# Patient Record
Sex: FEMALE | Race: WHITE | URBAN - METROPOLITAN AREA
[De-identification: names, ages, dates, MRNs, and addresses within clinical notes are randomized per-mention and may not be internally consistent; named-entity substitution may affect disease eponyms.]

---

## 2024-11-22 ENCOUNTER — OFFICE VISIT (OUTPATIENT)
Dept: PODIATRY CLINIC | Facility: CLINIC | Age: 69
End: 2024-11-22

## 2024-11-22 DIAGNOSIS — M20.41 HAMMER TOES OF BOTH FEET: ICD-10-CM

## 2024-11-22 DIAGNOSIS — E11.42 DIABETIC POLYNEUROPATHY ASSOCIATED WITH TYPE 2 DIABETES MELLITUS (HCC): Primary | ICD-10-CM

## 2024-11-22 DIAGNOSIS — M20.42 HAMMER TOES OF BOTH FEET: ICD-10-CM

## 2024-11-22 DIAGNOSIS — B35.1 ONYCHOMYCOSIS: ICD-10-CM

## 2024-11-22 DIAGNOSIS — G12.21 ALS (AMYOTROPHIC LATERAL SCLEROSIS) (HCC): ICD-10-CM

## 2024-11-22 PROCEDURE — 99203 OFFICE O/P NEW LOW 30 MIN: CPT | Performed by: STUDENT IN AN ORGANIZED HEALTH CARE EDUCATION/TRAINING PROGRAM

## 2024-11-24 NOTE — PROGRESS NOTES
[facility] Podiatry  Progress Note    Magdalena White is a 69 year old female.   Chief Complaint   Patient presents with    Toenail Care     Nail care and foot check- fbg 79- A1c 10.7 05/13/24- ldv 11/27/24-          HPI:     Patient is a very pleasant 69-year-old female with past medical history of diabetes and ALS who presents to clinic for foot exam with sister.  She has elongated and thickened nails she has difficulty trimming on her own.  She does use walker for ambulation due to ALS.  She denies any open sores to feet or other concerns.  Her blood sugars were 79 mg/dL and last checked.  Her last hemoglobin A1c was 10.7% on 5/13/2024.  No other complaints are mentioned.      Allergies: Sulfa antibiotics and Xolair [omalizumab]   No current outpatient medications on file.      History reviewed. No pertinent past medical history.   History reviewed. No pertinent surgical history.   History reviewed. No pertinent family history.   Social History     Socioeconomic History    Marital status: Single           REVIEW OF SYSTEMS:     No n/v/f/c.      EXAM:   There were no vitals taken for this visit.  GENERAL: well developed, well nourished, in no apparent distress  EXTREMITIES:   1. Integument: Normal skin temperature and turgor. Nails x 10 are elongated and thickened and with subungual debris.  2. Vascular: Pedal pulses are palpable.   3. Musculoskeletal: Flexion contracture to lesser digits. Decreased strength from ALS.   4. Neurological: Normal sharp dull sensation; reflexes normal. Protective sensation mostly intact via 10g monofilament.          ASSESSMENT AND PLAN:   Diagnoses and all orders for this visit:    Diabetic polyneuropathy associated with type 2 diabetes mellitus (HCC)    Hammer toes of both feet    Onychomycosis    ALS (amyotrophic lateral sclerosis) ([facility])        Plan:    -Patient examined, chart history reviewed.  -Discussed importance of proper pedal hygiene, regular foot checks, and tight  glucose control.  -Sharply debrided nails x10 with a sterile nail nipper achieving a 20% reduction in thickness and length, without incident. Nails further smoothed with dremel.  -Ambulate with supportive shoes and inserts and avoid walking barefoot.  -Educated patient on acute signs of infection advised patient to seek immediate medical attention if symptoms arise.     The patient indicates understanding of these issues and agrees to the plan.    RTC 2 months.    Fidencio Evans DPM    Dragon speech recognition software was used to prepare this note.  Errors in word recognition may occur.  Please contact me with any questions/concerns with this note.

## 2025-02-14 ENCOUNTER — OFFICE VISIT (OUTPATIENT)
Dept: PODIATRY CLINIC | Facility: CLINIC | Age: 70
End: 2025-02-14

## 2025-02-14 DIAGNOSIS — M20.42 HAMMER TOES OF BOTH FEET: ICD-10-CM

## 2025-02-14 DIAGNOSIS — M20.41 HAMMER TOES OF BOTH FEET: ICD-10-CM

## 2025-02-14 DIAGNOSIS — E11.42 DIABETIC POLYNEUROPATHY ASSOCIATED WITH TYPE 2 DIABETES MELLITUS (HCC): Primary | ICD-10-CM

## 2025-02-14 DIAGNOSIS — B35.1 ONYCHOMYCOSIS: ICD-10-CM

## 2025-02-14 DIAGNOSIS — G12.21 ALS (AMYOTROPHIC LATERAL SCLEROSIS) (HCC): ICD-10-CM

## 2025-02-14 PROCEDURE — 99213 OFFICE O/P EST LOW 20 MIN: CPT | Performed by: STUDENT IN AN ORGANIZED HEALTH CARE EDUCATION/TRAINING PROGRAM

## 2025-02-15 NOTE — PROGRESS NOTES
[facility] Podiatry  Progress Note    Magdalena White is a 69 year old female.   Chief Complaint   Patient presents with    Diabetic Foot Care     12 weeks f/u - last LmR1K=9.1 from 2/7/25 - has no c/o regarding her feet - this AM her OI=148         HPI:     Patient is a very pleasant 69-year-old female with past medical history of diabetes and ALS who presents to clinic for foot exam with sister.  She has elongated and thickened nails she has difficulty trimming on her own.  She does use walker for ambulation due to ALS.  She denies any open sores to feet or other concerns.  Her blood sugars were 182 mg/dL and last checked.  Her last hemoglobin A1c was 7.1% on 2/7/2025.  No other complaints are mentioned.      Allergies: Sulfa antibiotics and Xolair [omalizumab]   No current outpatient medications on file.      History reviewed. No pertinent past medical history.   History reviewed. No pertinent surgical history.   History reviewed. No pertinent family history.   Social History     Socioeconomic History    Marital status: Single           REVIEW OF SYSTEMS:     No n/v/f/c.      EXAM:   There were no vitals taken for this visit.  GENERAL: well developed, well nourished, in no apparent distress  EXTREMITIES:   1. Integument: Normal skin temperature and turgor. Nails x 10 are elongated and thickened and with subungual debris, especially hallux nails.  2. Vascular: Pedal pulses are palpable.   3. Musculoskeletal: Flexion contracture to lesser digits. Decreased strength from ALS.   4. Neurological: Normal sharp dull sensation; reflexes normal. Protective sensation mostly intact via 10g monofilament.    Bilateral barefoot skin diabetic exam is abnormal with dystrophic nails and/or dry skin         ASSESSMENT AND PLAN:   Diagnoses and all orders for this visit:    Diabetic polyneuropathy associated with type 2 diabetes mellitus (HCC)    Hammer toes of both feet    Onychomycosis    ALS (amyotrophic lateral sclerosis)  ([facility])        Plan:    -Patient examined, chart history reviewed.  -Discussed importance of proper pedal hygiene, regular foot checks, and tight glucose control.  -Sharply debrided nails x10 with a sterile nail nipper achieving a 20% reduction in thickness and length, without incident. Nails further smoothed with dremel.  -Ambulate with supportive shoes and inserts and avoid walking barefoot.  -Educated patient on acute signs of infection advised patient to seek immediate medical attention if symptoms arise.     The patient indicates understanding of these issues and agrees to the plan.    RTC 2 months.    DESHAUN Osorio speech recognition software was used to prepare this note.  Errors in word recognition may occur.  Please contact me with any questions/concerns with this note.

## 2025-02-17 ENCOUNTER — SPINE CENTER NAVIGATION (OUTPATIENT)
Age: 70
End: 2025-02-17

## 2025-02-17 ENCOUNTER — APPOINTMENT (OUTPATIENT)
Dept: GENERAL RADIOLOGY | Age: 70
End: 2025-02-17
Attending: EMERGENCY MEDICINE
Payer: MEDICARE

## 2025-02-17 ENCOUNTER — HOSPITAL ENCOUNTER (OUTPATIENT)
Age: 70
Discharge: HOME | End: 2025-02-17
Payer: MEDICARE

## 2025-02-17 VITALS
TEMPERATURE: 98 F | OXYGEN SATURATION: 97 % | DIASTOLIC BLOOD PRESSURE: 72 MMHG | HEART RATE: 77 BPM | SYSTOLIC BLOOD PRESSURE: 124 MMHG | RESPIRATION RATE: 20 BRPM

## 2025-02-17 DIAGNOSIS — M54.50 ACUTE RIGHT-SIDED LOW BACK PAIN, UNSPECIFIED WHETHER SCIATICA PRESENT: ICD-10-CM
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
## 2025-02-17 DIAGNOSIS — S22.080A T12 COMPRESSION FRACTURE, INITIAL ENCOUNTER (HCC): ICD-10-CM

## 2025-02-17 PROCEDURE — 73502 X-RAY EXAM HIP UNI 2-3 VIEWS: CPT | Performed by: EMERGENCY MEDICINE

## 2025-02-17 PROCEDURE — 72110 X-RAY EXAM L-2 SPINE 4/>VWS: CPT | Performed by: EMERGENCY MEDICINE

## 2025-02-17 PROCEDURE — 99204 OFFICE O/P NEW MOD 45 MIN: CPT | Performed by: EMERGENCY MEDICINE

## 2025-02-17 RX ORDER — SEMAGLUTIDE 2.68 MG/ML
2 INJECTION, SOLUTION SUBCUTANEOUS
COMMUNITY
Start: 2024-08-29

## 2025-02-17 RX ORDER — HYDROCODONE BITARTRATE AND ACETAMINOPHEN 5; 325 MG/1; MG/1
TABLET ORAL
Qty: 10 TABLET | Refills: 0 | Status: SHIPPED | OUTPATIENT
Start: 2025-02-17 | End: 2025-02-17

## 2025-02-17 RX ORDER — MONTELUKAST SODIUM 10 MG/1
10 TABLET ORAL DAILY
COMMUNITY

## 2025-02-17 RX ORDER — VALSARTAN AND HYDROCHLOROTHIAZIDE 80; 12.5 MG/1; MG/1
1 TABLET, FILM COATED ORAL DAILY
COMMUNITY
Start: 2023-03-29

## 2025-02-17 RX ORDER — DIPHENHYDRAMINE HCL 25 MG
25 TABLET ORAL EVERY 6 HOURS PRN
COMMUNITY

## 2025-02-17 RX ORDER — GABAPENTIN 600 MG/1
600 TABLET ORAL 4 TIMES DAILY
COMMUNITY
Start: 2025-02-03

## 2025-02-17 RX ORDER — LEVOTHYROXINE SODIUM 125 UG/1
125 TABLET ORAL
COMMUNITY
Start: 2022-12-05

## 2025-02-17 RX ORDER — ESOMEPRAZOLE MAGNESIUM 40 MG/1
80 CAPSULE, DELAYED RELEASE ORAL
COMMUNITY

## 2025-02-17 RX ORDER — URSODIOL 300 MG/1
600 CAPSULE ORAL 2 TIMES DAILY
COMMUNITY

## 2025-02-17 RX ORDER — INSULIN ASPART 100 [IU]/ML
10 INJECTION, SUSPENSION SUBCUTANEOUS 2 TIMES DAILY WITH MEALS
COMMUNITY

## 2025-02-17 RX ORDER — TRAMADOL HYDROCHLORIDE 25 MG/1
1 TABLET, COATED ORAL EVERY 8 HOURS PRN
Qty: 10 TABLET | Refills: 0 | Status: SHIPPED | OUTPATIENT
Start: 2025-02-17

## 2025-02-17 RX ORDER — MEXILETINE HYDROCHLORIDE 150 MG/1
300 CAPSULE ORAL 3 TIMES DAILY
COMMUNITY
Start: 2024-09-11

## 2025-02-17 RX ORDER — ALBUTEROL SULFATE 0.83 MG/ML
3 SOLUTION RESPIRATORY (INHALATION) EVERY 4 HOURS PRN
COMMUNITY
Start: 2023-12-19

## 2025-02-17 RX ORDER — INSULIN DEGLUDEC 100 U/ML
40 INJECTION, SOLUTION SUBCUTANEOUS DAILY
COMMUNITY
Start: 2024-12-20

## 2025-02-17 RX ORDER — ATORVASTATIN CALCIUM 40 MG/1
40 TABLET, FILM COATED ORAL DAILY
COMMUNITY
Start: 2022-11-10

## 2025-02-17 RX ORDER — FEXOFENADINE HCL 180 MG/1
180 TABLET ORAL DAILY
COMMUNITY

## 2025-02-17 RX ORDER — CALCIUM CARBONATE/VITAMIN D3 500 MG-10
1 TABLET ORAL DAILY
COMMUNITY

## 2025-02-17 RX ORDER — INSULIN ASPART 100 [IU]/ML
INJECTION, SOLUTION INTRAVENOUS; SUBCUTANEOUS
COMMUNITY
Start: 2025-02-03

## 2025-02-17 RX ORDER — DULOXETIN HYDROCHLORIDE 30 MG/1
30 CAPSULE, DELAYED RELEASE ORAL DAILY
COMMUNITY
Start: 2024-12-30

## 2025-02-17 RX ORDER — FLUTICASONE PROPIONATE 50 MCG
2 SPRAY, SUSPENSION (ML) NASAL DAILY
COMMUNITY

## 2025-02-17 NOTE — ED PROVIDER NOTES
Patient Seen in: [facility]      History     Chief Complaint   Patient presents with    Fall     Stated Complaint: Fall    Subjective:   HPI  Magdalena White is a 69 year old female here for mechanical fall 9 days ago.  Symptoms not getting better. No numbness, tingling, or new lower extremity weakness.  Medical history includes not limited to ALS, diabetes, hypertension,    Objective:     Past Medical History:    ALS (amyotrophic lateral sclerosis) (HCC)    Asthma (HCC)    Diabetes (HCC)    Essential hypertension    Primary biliary cirrhosis (HCC)    Thyroid disease              Past Surgical History:   Procedure Laterality Date    Anesth,surgery of shoulder Right     Fracture surgery      Total abdom hysterectomy                  No pertinent social history.            Review of Systems    Positive for stated complaint: Fall  Other systems are as noted in HPI.  Constitutional and vital signs reviewed.      All other systems reviewed and negative except as noted above.    Physical Exam     ED Triage Vitals [02/17/25 1254]   /80   Pulse 95   Resp 18   Temp 98.2 °F (36.8 °C)   Temp src Oral   SpO2 95 %   O2 Device None (Room air)       Current Vitals:   Vital Signs  BP: 124/72  Pulse: 77  Resp: 20  Temp: 98.2 °F (36.8 °C)  Temp src: Oral    Oxygen Therapy  SpO2: 97 %  O2 Device: None (Room air)        Physical Exam  Vitals and nursing note reviewed.   Constitutional:       General: She is not in acute distress.     Appearance: Normal appearance. She is not ill-appearing or toxic-appearing.   HENT:      Head: Normocephalic.      Nose: Nose normal.   Eyes:      Pupils: Pupils are equal, round, and reactive to light.   Cardiovascular:      Rate and Rhythm: Normal rate.      Pulses: Normal pulses.   Pulmonary:      Effort: Pulmonary effort is normal.   Musculoskeletal:         General: No deformity.      Comments: Decreased range of motion to lumbar spine due to lower back pain.  Tenderness  over T12, L1, and L2.  No bruising, swelling, or rash.   Skin:     Capillary Refill: Capillary refill takes less than 2 seconds.   Neurological:      General: No focal deficit present.      Mental Status: She is alert and oriented to person, place, and time.      Sensory: No sensory deficit.   Psychiatric:         Mood and Affect: Mood normal.         Behavior: Behavior normal.         Thought Content: Thought content normal.         Judgment: Judgment normal.             ED Course   Labs Reviewed - No data to display                MDM           Medical Decision Making    Back pain DDx: strain, pna, pna, disc herniation, Cauda equina, spinal stenosis, Vertebral Malignancy/Mets, Spinal Fracture, Osteomyelitis, Epidural Abscess, Infected/Obstructing Kidney Stone, referred internal organ pain, somatic etc.    Treat for suspicion for T12 fracture due to fall, and x-rays.  Low-dose tramadol sent to the pharmacy.  Otc medications discussed along with alternative care.  No urinary symptoms.  Continues to deny new lower extremity weakness, saddle numbness, hypoesthesia, or anesthesia.  Modified activity and work should be implemented; no neurologic signs.  Avoid heavy lifting, pulling, or pushing.    Use good body mechanics    Specialist:   Due to medical history, symptoms, and x-ray spinal clinic order placed.    I Updated patient  on all findings, who verbalized understanding and agreement with the plan. Steady gait.   I explained to the patient that emergent conditions may arise and to go to the ER for new, worsening or any persistent conditions.   All questions answered. No acute distress and cleared for home    Problems Addressed:  Acute right-sided low back pain, unspecified whether sciatica present: acute illness or injury  Fall, initial encounter: acute illness or injury  T12 compression fracture, initial encounter (HCC): acute illness or injury    Amount and/or Complexity of Data Reviewed  External Data Reviewed:  notes.    Risk  OTC drugs.  Prescription drug management.        Disposition and Plan     Clinical Impression:  1. Fall, initial encounter    2. Acute right-sided low back pain, unspecified whether sciatica present    3. T12 compression fracture, initial encounter ([facility])         Disposition:  Discharge  2/17/2025  3:17 pm    Follow-up:  Troy Rivers MD  [address] [place] [address] [place] [id number]  [phone]                Medications Prescribed:  Discharge Medication List as of 2/17/2025  3:24 PM        START taking these medications    Details   traMADol HCl 25 MG Oral Tab Take 1 tablet by mouth every 8 (eight) hours as needed., Normal, Disp-10 tablet, R-0                 Supplementary Documentation:

## 2025-02-17 NOTE — PROGRESS NOTES
Spine Center Referral Navigation Encounter Note    Referred by: LUIS FERNANDO Rodriguez    Imaging: XR Lumbar Spine   If imaging done at an external facility, instructed patient to bring disc of MRI to appointment.     Previously Seen Spine Care Providers: None    Referred to: Josias Mendoza MD in Pain Management     2/17- Spoke to patient and was able to schedule for  2/26 at 1pm. She has a T12 compression fracture so she was scheduled with Dr. Mendoza.

## 2025-02-26 ENCOUNTER — OFFICE VISIT (OUTPATIENT)
Dept: PAIN CLINIC | Facility: CLINIC | Age: 70
End: 2025-02-26
Payer: COMMERCIAL

## 2025-02-26 VITALS
SYSTOLIC BLOOD PRESSURE: 124 MMHG | DIASTOLIC BLOOD PRESSURE: 62 MMHG | WEIGHT: 180 LBS | OXYGEN SATURATION: 96 % | HEART RATE: 80 BPM

## 2025-02-26 DIAGNOSIS — S22.080A CLOSED WEDGE COMPRESSION FRACTURE OF T12 VERTEBRA, INITIAL ENCOUNTER (HCC): Primary | ICD-10-CM

## 2025-02-26 PROBLEM — S22.080D CLOSED WEDGE COMPRESSION FRACTURE OF T12 VERTEBRA WITH ROUTINE HEALING: Status: ACTIVE | Noted: 2025-02-26

## 2025-02-26 PROCEDURE — 99204 OFFICE O/P NEW MOD 45 MIN: CPT | Performed by: ANESTHESIOLOGY

## 2025-02-26 RX ORDER — ALBUTEROL SULFATE 90 UG/1
1 INHALANT RESPIRATORY (INHALATION) AS DIRECTED
COMMUNITY

## 2025-02-26 RX ORDER — RILUZOLE 50 MG/1
50 TABLET, FILM COATED ORAL
COMMUNITY
Start: 2025-01-31

## 2025-02-26 RX ORDER — POLYETHYLENE GLYCOL 3350 17 G/17G
17 POWDER, FOR SOLUTION ORAL DAILY
COMMUNITY

## 2025-02-26 RX ORDER — METHYLPREDNISOLONE 4 MG/1
TABLET ORAL
COMMUNITY
Start: 2025-02-11

## 2025-02-26 RX ORDER — HYDROCODONE BITARTRATE AND ACETAMINOPHEN 5; 325 MG/1; MG/1
1 TABLET ORAL EVERY 6 HOURS PRN
COMMUNITY
Start: 2025-02-20

## 2025-02-26 NOTE — PATIENT INSTRUCTIONS
Refill policies:    Allow 2-3 business days for refills; controlled substances may take longer.  Contact your pharmacy at least 5 days prior to running out of medication and have them send an electronic request or submit request through the “request refill” option in your [employer] account.  Refills are not addressed on weekends; covering physicians do not authorize routine medications on weekends.  No narcotics or controlled substances are refilled after noon on Fridays or by on call physicians.  By law, narcotics must be electronically prescribed.  A 30 day supply with no refills is the maximum allowed.  If your prescription is due for a refill, you may be due for a follow up appointment.  To best provide you care, patients receiving routine medications need to be seen at least once a year.  Patients receiving narcotic/controlled substance medications need to be seen at least once every 3 months.  In the event that your preferred pharmacy does not have the requested medication in stock (e.g. Backordered), it is your responsibility to find another pharmacy that has the requested medication available.  We will gladly send a new prescription to that pharmacy at your request.    Scheduling Tests:    If your physician has ordered radiology tests such as MRI or CT scans, please contact Central Scheduling at [phone] right away to schedule the test.  Once scheduled, the [facility] Centralized Referral Team will work with your insurance carrier to obtain pre-certification or prior authorization.  Depending on your insurance carrier, approval may take 3-10 days.  It is highly recommended patients assure they have received an authorization before having a test performed.  If test is done without insurance authorization, patient may be responsible for the entire amount billed.      Precertification and Prior Authorizations:  If your physician has recommended that you have a procedure or additional testing performed the [facility]  Centralized Referral Team will contact your insurance carrier to obtain pre-certification or prior authorization.    You are strongly encouraged to contact your insurance carrier to verify that your procedure/test has been approved and is a COVERED benefit.  Although the [facility] Centralized Referral Team does its due diligence, the insurance carrier gives the disclaimer that \"Although the procedure is authorized, this does not guarantee payment.\"    Ultimately the patient is responsible for payment.   Thank you for your understanding in this matter.  Paperwork Completion:  If you require FMLA or disability paperwork for your recovery, please make sure to either drop it off or have it faxed to our office at [phone]. Be sure the form has your name and date of birth on it.  The form will be faxed to our Forms Department and they will complete it for you.  There is a 25$ fee for all forms that need to be filled out.  Please be aware there is a 10-14 day turnaround time.  You will need to sign a release of information (VIK) form if your paperwork does not come with one.  You may call the Forms Department with any questions at [phone].  Their fax number is [phone].

## 2025-02-26 NOTE — PROGRESS NOTES
Subjective:   Patient ID: Magdalena White is a 69 year old female.    HPI    History/Other:   Review of Systems  Current Outpatient Medications   Medication Sig Dispense Refill    albuterol (2.5 MG/3ML) 0.083% Inhalation Nebu Soln Inhale 3 mL into the lungs every 4 (four) hours as needed.      atorvastatin 40 MG Oral Tab Take 1 tablet (40 mg total) by mouth daily.      Calcium Carb-Cholecalciferol 500-10 MG-MCG Oral Tab Take 1 tablet by mouth daily.      diphenhydrAMINE HCl (BENADRYL ALLERGY) 25 MG Oral Tab Take 1 tablet (25 mg total) by mouth every 6 (six) hours as needed.      DULoxetine 30 MG Oral Cap DR Particles Take 1 capsule (30 mg total) by mouth daily.      Esomeprazole Magnesium 40 MG Oral Capsule Delayed Release Take 2 capsules (80 mg total) by mouth before breakfast.      fexofenadine 180 MG Oral Tab Take 1 tablet (180 mg total) by mouth daily. (Patient not taking: Reported on 2/17/2025)      fluticasone propionate 50 MCG/ACT Nasal Suspension 2 sprays by Nasal route daily.      gabapentin 600 MG Oral Tab 1 tablet (600 mg total) in the morning, at noon, in the evening, and at bedtime.      NOVOLOG FLEXPEN 100 UNIT/ML Subcutaneous Solution Pen-injector INJECT 10 UNITS UNDER THE SKIN BEFORE MEALS THREE TIMES DAILY      Insulin Aspart Prot & Aspart (INSULIN ASP PROT & ASP FLEXPEN) (70-30) 100 UNIT/ML Subcutaneous Suspension Pen-injector Inject 10 Units into the skin 2 (two) times daily with meals.      insulin degludec (TRESIBA FLEXTOUCH) 100 UNIT/ML Subcutaneous Solution Pen-injector Inject 40 Units into the skin daily.      levothyroxine 125 MCG Oral Tab Take 1 tablet (125 mcg total) by mouth before breakfast.      mexiletine 150 MG Oral Cap Take 2 capsules (300 mg total) by mouth 3 (three) times daily.      montelukast 10 MG Oral Tab Take 1 tablet (10 mg total) by mouth daily.      OZEMPIC, 2 MG/DOSE, 8 MG/3ML Subcutaneous Solution Pen-injector Inject 2 mg into the skin every 7 days.      ursodiol 300 MG  Oral Cap Take 2 capsules (600 mg total) by mouth 2 (two) times daily.      valsartan-hydroCHLOROthiazide 80-12.5 MG Oral Tab Take 1 tablet by mouth daily.      traMADol HCl 25 MG Oral Tab Take 1 tablet by mouth every 8 (eight) hours as needed. 10 tablet 0     Allergies:Allergies[1]    Objective:   Physical Exam    Assessment & Plan:   No diagnosis found.    No orders of the defined types were placed in this encounter.      Meds This Visit:  Requested Prescriptions      No prescriptions requested or ordered in this encounter       Imaging & Referrals:  None      Location of Pain: low back    Date Pain Began: 02/08/25          Work Related:   No        Receiving Work Comp/Disability:   No    Numeric Rating Scale:  Pain at Present:  3                                                                                                            (No Pain) 0  to  10 (Worst Pain)                 Minimum Pain:   3  Maximum Pain  8    Distribution of Pain:    right    Quality of Pain:   sharp/stabbing and shooting    Origin of Pain:    Traumatic Accident    Aggravating Factors:    Other bending    Past Treatments for Current Pain Condition:   Other medication    Prior diagnostic testing for your pain:  xray          [1]   Allergies  Allergen Reactions    Omalizumab UNKNOWN, OTHER (SEE COMMENTS) and ITCHING     Runny nose    Nasal congestion     Xolair    Sulfa Antibiotics HIVES    Clarithromycin RASH

## 2025-02-26 NOTE — H&P
Name: Magdalena White   : 1955   DOS: 2025     Chief complaint: Back pain    History of present illness:  Magdalena White is a 69 year old female with a history of hypertension, biliary cirrhosis, diabetes, ALS who presents today for evaluation of back pain.  The patient suffered a fall approximately 2 weeks ago which led to onset of pain at the thoracolumbar junction.  This is described as sharp and stabbing.  This is made worse by activity.  Patient was seen in urgent care and x-ray shows age-indeterminate T12 compression fracture.  Referred here to discuss treatment options.  Of note, she is taking hydrocodone which does help with her pain.    She denies any chills, fever or weakness. She denies any bladder or bowel incontinence.      Past Medical History:    ALS (amyotrophic lateral sclerosis) (HCC)    Asthma (HCC)    Diabetes (HCC)    Essential hypertension    Primary biliary cirrhosis (HCC)    Thyroid disease      Current Outpatient Medications   Medication Sig Dispense Refill    HYDROcodone-acetaminophen 5-325 MG Oral Tab Take 1 tablet by mouth every 6 (six) hours as needed.      methylPREDNISolone 4 MG Oral Tablet Therapy Pack       Polyethylene Glycol 3350 17 g Oral Powd Pack Take 17 g by mouth daily.      riluzole 50 MG Oral Tab Take 1 tablet (50 mg total) by mouth.      albuterol 108 (90 Base) MCG/ACT Inhalation Aero Soln Inhale 1 puff into the lungs As Directed.      albuterol (2.5 MG/3ML) 0.083% Inhalation Nebu Soln Inhale 3 mL into the lungs every 4 (four) hours as needed.      atorvastatin 40 MG Oral Tab Take 1 tablet (40 mg total) by mouth daily.      Calcium Carb-Cholecalciferol 500-10 MG-MCG Oral Tab Take 1 tablet by mouth daily.      diphenhydrAMINE HCl (BENADRYL ALLERGY) 25 MG Oral Tab Take 1 tablet (25 mg total) by mouth every 6 (six) hours as needed.      DULoxetine 30 MG Oral Cap DR Particles Take 1 capsule (30 mg total) by mouth daily.      Esomeprazole Magnesium 40 MG Oral  Capsule Delayed Release Take 2 capsules (80 mg total) by mouth before breakfast.      fexofenadine 180 MG Oral Tab Take 1 tablet (180 mg total) by mouth daily.      fluticasone propionate 50 MCG/ACT Nasal Suspension 2 sprays by Nasal route daily.      gabapentin 600 MG Oral Tab 1 tablet (600 mg total) in the morning, at noon, in the evening, and at bedtime.      NOVOLOG FLEXPEN 100 UNIT/ML Subcutaneous Solution Pen-injector INJECT 10 UNITS UNDER THE SKIN BEFORE MEALS THREE TIMES DAILY      Insulin Aspart Prot & Aspart (INSULIN ASP PROT & ASP FLEXPEN) (70-30) 100 UNIT/ML Subcutaneous Suspension Pen-injector Inject 10 Units into the skin 2 (two) times daily with meals.      insulin degludec (TRESIBA FLEXTOUCH) 100 UNIT/ML Subcutaneous Solution Pen-injector Inject 40 Units into the skin daily.      levothyroxine 125 MCG Oral Tab Take 1 tablet (125 mcg total) by mouth before breakfast.      mexiletine 150 MG Oral Cap Take 2 capsules (300 mg total) by mouth 3 (three) times daily.      montelukast 10 MG Oral Tab Take 1 tablet (10 mg total) by mouth daily.      OZEMPIC, 2 MG/DOSE, 8 MG/3ML Subcutaneous Solution Pen-injector Inject 2 mg into the skin every 7 days.      ursodiol 300 MG Oral Cap Take 2 capsules (600 mg total) by mouth 2 (two) times daily.      valsartan-hydroCHLOROthiazide 80-12.5 MG Oral Tab Take 1 tablet by mouth daily.       Past Surgical History:   Procedure Laterality Date    Anesth,surgery of shoulder Right     Fracture surgery      Total abdom hysterectomy        No family history on file.  Social History     Socioeconomic History    Marital status: Single     Social Drivers of Health     Food Insecurity: No Food Insecurity (1/9/2025)    Received from [facility]    Food Insecurity     Currently or in the past 3 months, have you worried your food would run out before you had money to buy more?: No     In the past 12 months, have you run out of food or been unable to get more?: No    Transportation Needs: No Transportation Needs (1/9/2025)    Received from [facility]    Transportation Needs     Currently or in the past 3 months, has lack of transportation kept you from medical appointments, getting food or medicine, or providing care to a family member?: Unrecognized value     Medical Transportation Needs?: No   Housing Stability: Low Risk  (5/14/2024)    Received from [facility]    Housing Stability     Are you worried that your electric, gas, oil, or water might be shut off?: No     Are you concerned about having a safe and reliable place to live?: No       Review of  other systems:  10 point ROS otherwise negative    Physical examination: Magdalena is a 69 year old female not in acute distress  /62   Pulse 80   Wt 180 lb (81.6 kg)   SpO2 96%    The patient is awake, alert, oriented and corporative. She has a normal affect. The patient ambulates with normal gait.  HEENT: No gross lesion noted. PEERL. No icterus.  Neck and Upper Extremity: Supple. No thyromegaly or lymphadenopathy.   Motor Examination:    (R)   (L)  Deltoid:      5    5  Biceps:       5    5  Triceps:      5    5  Wrist Extension:     5    5  Wrist Flexsion:     5    5  Finger Extension:     5    5  Finger Flexsion:     5    5    Cardiovascular system: No peripheral edema  Respiratory system: Speech is nonlabored.  Skin: Warm, dry  Neurologic:  Cranial nerves II through XII are grossly intact.       Examination of the lower back: Gait is intact.  Does have pain to palpation of the spinous processes along the midline  Radiology diagnostic studies:   Thoracic x-ray reviewed with T12 compression fracture  DEXA scan reviewed from 2021 with loss of bone density but does not quite meet definition of osteopenia    Assessment:  Encounter Diagnosis   Name Primary?    Closed wedge compression fracture of T12 vertebra, initial encounter (HCC) Yes   .      Plan:     The patient is a  69-year-old female who presents today for evaluation of back pain.  Patient has sudden onset of back pain after suffering a fall.  Was seen in the urgent care with x-ray showing T12 compression fracture.  This was not seen on her April 2024 MRI.  Presumed fracture occurred from fall however, will need MRI to determine chronicity of fracture and to see if there is any retropulsion.  Discussed follow-up after MRI is completed to facilitate kyphoplasty.  Additionally, patient does have a DEXA from 2021 which showed significant loss of bone density but at that time did not quite meet criteria for osteopenia.  This likely changed.  However, to confirm that, we will order repeat DEXA.      Josias Mendoza MD MPH  Pain Management

## 2025-03-03 ENCOUNTER — HOSPITAL ENCOUNTER (OUTPATIENT)
Dept: BONE DENSITY | Age: 70
Discharge: HOME | End: 2025-03-03
Attending: ANESTHESIOLOGY
Payer: MEDICARE

## 2025-03-03 DIAGNOSIS — S22.080A CLOSED WEDGE COMPRESSION FRACTURE OF T12 VERTEBRA, INITIAL ENCOUNTER (HCC): ICD-10-CM

## 2025-03-03 PROCEDURE — 77080 DXA BONE DENSITY AXIAL: CPT | Performed by: ANESTHESIOLOGY

## 2025-04-18 ENCOUNTER — OFFICE VISIT (OUTPATIENT)
Dept: PODIATRY CLINIC | Facility: CLINIC | Age: 70
End: 2025-04-18

## 2025-04-18 DIAGNOSIS — M20.42 HAMMER TOES OF BOTH FEET: ICD-10-CM

## 2025-04-18 DIAGNOSIS — E11.42 DIABETIC POLYNEUROPATHY ASSOCIATED WITH TYPE 2 DIABETES MELLITUS (HCC): Primary | ICD-10-CM

## 2025-04-18 DIAGNOSIS — G12.21 ALS (AMYOTROPHIC LATERAL SCLEROSIS) (HCC): ICD-10-CM

## 2025-04-18 DIAGNOSIS — B35.1 ONYCHOMYCOSIS: ICD-10-CM

## 2025-04-18 DIAGNOSIS — M20.41 HAMMER TOES OF BOTH FEET: ICD-10-CM

## 2025-04-18 PROCEDURE — 99213 OFFICE O/P EST LOW 20 MIN: CPT | Performed by: STUDENT IN AN ORGANIZED HEALTH CARE EDUCATION/TRAINING PROGRAM

## 2025-04-18 RX ORDER — BUDESONIDE AND FORMOTEROL FUMARATE DIHYDRATE 80; 4.5 UG/1; UG/1
2 AEROSOL RESPIRATORY (INHALATION) EVERY 12 HOURS
COMMUNITY
Start: 2025-04-03

## 2025-04-18 RX ORDER — SENNOSIDES A AND B 8.6 MG/1
1 TABLET, FILM COATED ORAL 2 TIMES DAILY
COMMUNITY
Start: 2025-04-12

## 2025-04-18 RX ORDER — DICYCLOMINE HYDROCHLORIDE 10 MG/1
10 CAPSULE ORAL 3 TIMES DAILY PRN
COMMUNITY
Start: 2025-04-12

## 2025-04-18 RX ORDER — TRAMADOL HYDROCHLORIDE 50 MG/1
50 TABLET ORAL EVERY 6 HOURS PRN
COMMUNITY
Start: 2025-04-12

## 2025-04-18 NOTE — PROGRESS NOTES
[facility] Podiatry  Progress Note    Magdalena White is a 69 year old female.   Chief Complaint   Patient presents with    Diabetic Foot Care     Nail care and foot check-- A1C 7.1 on 2/7         HPI:     Patient is a very pleasant 69-year-old female with past medical history of diabetes and ALS who presents to clinic for foot exam with sister.  She has elongated and thickened nails she has difficulty trimming on her own.  She does use walker for ambulation due to ALS.  She denies any open sores to feet or other concerns.  Her blood sugars were 134 mg/dL and last checked.  Her last hemoglobin A1c was 7.1% on 2/7/2025.  No other complaints are mentioned.      Allergies: Omalizumab, Sulfa antibiotics, and Clarithromycin   Current Outpatient Medications   Medication Sig Dispense Refill    Apixaban Starter Pack 5 MG Oral Tablet Therapy Pack Take 2 tablets (10 mg total) by mouth 2 (two) times a day for 7 days, then take 1 tablet (5 mg total) by mouth  2 (two) times a day      Budesonide-Formoterol Fumarate 80-4.5 MCG/ACT Inhalation Aerosol Inhale 2 puffs into the lungs in the morning and 2 puffs in the evening.      dicyclomine 10 MG Oral Cap Take 1 capsule (10 mg total) by mouth 3 (three) times daily as needed.      linaCLOtide 145 MCG Oral Cap Take 145 mcg by mouth daily.      sennosides 8.6 MG Oral Tab Take 1 tablet (8.6 mg total) by mouth 2 (two) times daily.      traMADol 50 MG Oral Tab Take 1 tablet (50 mg total) by mouth every 6 (six) hours as needed.      HYDROcodone-acetaminophen 5-325 MG Oral Tab Take 1 tablet by mouth every 6 (six) hours as needed.      methylPREDNISolone 4 MG Oral Tablet Therapy Pack       Polyethylene Glycol 3350 17 g Oral Powd Pack Take 17 g by mouth daily.      riluzole 50 MG Oral Tab Take 1 tablet (50 mg total) by mouth.      albuterol 108 (90 Base) MCG/ACT Inhalation Aero Soln Inhale 1 puff into the lungs As Directed.      albuterol (2.5 MG/3ML) 0.083% Inhalation Nebu Soln  Inhale 3 mL into the lungs every 4 (four) hours as needed.      atorvastatin 40 MG Oral Tab Take 1 tablet (40 mg total) by mouth daily.      Calcium Carb-Cholecalciferol 500-10 MG-MCG Oral Tab Take 1 tablet by mouth daily.      diphenhydrAMINE HCl (BENADRYL ALLERGY) 25 MG Oral Tab Take 1 tablet (25 mg total) by mouth every 6 (six) hours as needed.      DULoxetine 30 MG Oral Cap DR Particles Take 1 capsule (30 mg total) by mouth daily.      Esomeprazole Magnesium 40 MG Oral Capsule Delayed Release Take 2 capsules (80 mg total) by mouth before breakfast.      fexofenadine 180 MG Oral Tab Take 1 tablet (180 mg total) by mouth daily.      fluticasone propionate 50 MCG/ACT Nasal Suspension 2 sprays by Nasal route daily.      gabapentin 600 MG Oral Tab 1 tablet (600 mg total) in the morning, at noon, in the evening, and at bedtime.      NOVOLOG FLEXPEN 100 UNIT/ML Subcutaneous Solution Pen-injector INJECT 10 UNITS UNDER THE SKIN BEFORE MEALS THREE TIMES DAILY      Insulin Aspart Prot & Aspart (INSULIN ASP PROT & ASP FLEXPEN) (70-30) 100 UNIT/ML Subcutaneous Suspension Pen-injector Inject 10 Units into the skin 2 (two) times daily with meals.      insulin degludec (TRESIBA FLEXTOUCH) 100 UNIT/ML Subcutaneous Solution Pen-injector Inject 40 Units into the skin daily.      levothyroxine 125 MCG Oral Tab Take 1 tablet (125 mcg total) by mouth before breakfast.      mexiletine 150 MG Oral Cap Take 2 capsules (300 mg total) by mouth 3 (three) times daily.      montelukast 10 MG Oral Tab Take 1 tablet (10 mg total) by mouth daily.      OZEMPIC, 2 MG/DOSE, 8 MG/3ML Subcutaneous Solution Pen-injector Inject 2 mg into the skin every 7 days.      ursodiol 300 MG Oral Cap Take 2 capsules (600 mg total) by mouth 2 (two) times daily.      valsartan-hydroCHLOROthiazide 80-12.5 MG Oral Tab Take 1 tablet by mouth daily.        Past Medical History:    ALS (amyotrophic lateral sclerosis) (HCC)    Asthma (HCC)    Diabetes (HCC)    Essential  hypertension    Primary biliary cirrhosis (HCC)    Thyroid disease      Past Surgical History:   Procedure Laterality Date    Anesth,surgery of shoulder Right     Fracture surgery      Total abdom hysterectomy        History reviewed. No pertinent family history.   Social History     Socioeconomic History    Marital status: Single           REVIEW OF SYSTEMS:     No n/v/f/c.      EXAM:   There were no vitals taken for this visit.  GENERAL: well developed, well nourished, in no apparent distress  EXTREMITIES:   1. Integument: Normal skin temperature and turgor. Nails x 10 are elongated and thickened and with subungual debris, especially hallux nails.  2. Vascular: Pedal pulses are palpable.   3. Musculoskeletal: Flexion contracture to lesser digits. Decreased strength from ALS.   4. Neurological: Normal sharp dull sensation; reflexes normal. Protective sensation mostly intact via 10g monofilament.    Bilateral barefoot skin diabetic exam is abnormal with dystrophic nails and/or dry skin         ASSESSMENT AND PLAN:   Diagnoses and all orders for this visit:    Diabetic polyneuropathy associated with type 2 diabetes mellitus (HCC)    Hammer toes of both feet    Onychomycosis    ALS (amyotrophic lateral sclerosis) ([facility])          Plan:    -Patient examined, chart history reviewed.  -Discussed importance of proper pedal hygiene, regular foot checks, and tight glucose control.  -Sharply debrided nails x10 with a sterile nail nipper achieving a 20% reduction in thickness and length, without incident. Nails further smoothed with dremel.  -Ambulate with supportive shoes and inserts and avoid walking barefoot.  -Educated patient on acute signs of infection advised patient to seek immediate medical attention if symptoms arise.     The patient indicates understanding of these issues and agrees to the plan.    RTC 2 months.    Fidencio Evans DPM    Dragon speech recognition software was used to prepare this note.  Errors in word  recognition may occur.  Please contact me with any questions/concerns with this note.

## 2025-06-20 ENCOUNTER — OFFICE VISIT (OUTPATIENT)
Dept: PODIATRY CLINIC | Facility: CLINIC | Age: 70
End: 2025-06-20

## 2025-06-20 DIAGNOSIS — M20.41 HAMMER TOES OF BOTH FEET: ICD-10-CM

## 2025-06-20 DIAGNOSIS — E11.42 DIABETIC POLYNEUROPATHY ASSOCIATED WITH TYPE 2 DIABETES MELLITUS (HCC): Primary | ICD-10-CM

## 2025-06-20 DIAGNOSIS — M20.42 HAMMER TOES OF BOTH FEET: ICD-10-CM

## 2025-06-20 DIAGNOSIS — G12.21 ALS (AMYOTROPHIC LATERAL SCLEROSIS) (HCC): ICD-10-CM

## 2025-06-20 DIAGNOSIS — B35.1 ONYCHOMYCOSIS: ICD-10-CM

## 2025-06-20 PROCEDURE — 99213 OFFICE O/P EST LOW 20 MIN: CPT | Performed by: STUDENT IN AN ORGANIZED HEALTH CARE EDUCATION/TRAINING PROGRAM

## 2025-06-20 NOTE — PROGRESS NOTES
[facility] Podiatry  Progress Note    Magdalena White is a 69 year old female.   Chief Complaint   Patient presents with    Diabetic Foot Care     Nail care and foot check- - A1C 7.1 on 2/7         HPI:     Patient is a very pleasant 69-year-old female with past medical history of diabetes and ALS who presents to clinic for foot exam with sister.  She has elongated and thickened nails she has difficulty trimming on her own.  She does use walker for ambulation due to ALS.  She denies any open sores to feet or other concerns.  Her blood sugars were 328 mg/dL and last checked.  Her last hemoglobin A1c was 7.1% on 2/7/2025.  No other complaints are mentioned.      Allergies: Omalizumab, Sulfa antibiotics, and Clarithromycin   Current Outpatient Medications   Medication Sig Dispense Refill    Apixaban Starter Pack 5 MG Oral Tablet Therapy Pack Take 2 tablets (10 mg total) by mouth 2 (two) times a day for 7 days, then take 1 tablet (5 mg total) by mouth  2 (two) times a day      Budesonide-Formoterol Fumarate 80-4.5 MCG/ACT Inhalation Aerosol Inhale 2 puffs into the lungs in the morning and 2 puffs in the evening.      dicyclomine 10 MG Oral Cap Take 1 capsule (10 mg total) by mouth 3 (three) times daily as needed.      linaCLOtide 145 MCG Oral Cap Take 145 mcg by mouth daily.      sennosides 8.6 MG Oral Tab Take 1 tablet (8.6 mg total) by mouth 2 (two) times daily.      traMADol 50 MG Oral Tab Take 1 tablet (50 mg total) by mouth every 6 (six) hours as needed.      HYDROcodone-acetaminophen 5-325 MG Oral Tab Take 1 tablet by mouth every 6 (six) hours as needed.      methylPREDNISolone 4 MG Oral Tablet Therapy Pack       Polyethylene Glycol 3350 17 g Oral Powd Pack Take 17 g by mouth daily.      riluzole 50 MG Oral Tab Take 1 tablet (50 mg total) by mouth.      albuterol 108 (90 Base) MCG/ACT Inhalation Aero Soln Inhale 1 puff into the lungs As Directed.      albuterol (2.5 MG/3ML) 0.083% Inhalation Nebu Soln  Inhale 3 mL into the lungs every 4 (four) hours as needed.      atorvastatin 40 MG Oral Tab Take 1 tablet (40 mg total) by mouth daily.      Calcium Carb-Cholecalciferol 500-10 MG-MCG Oral Tab Take 1 tablet by mouth daily.      diphenhydrAMINE HCl (BENADRYL ALLERGY) 25 MG Oral Tab Take 1 tablet (25 mg total) by mouth every 6 (six) hours as needed.      DULoxetine 30 MG Oral Cap DR Particles Take 1 capsule (30 mg total) by mouth daily.      Esomeprazole Magnesium 40 MG Oral Capsule Delayed Release Take 2 capsules (80 mg total) by mouth before breakfast.      fexofenadine 180 MG Oral Tab Take 1 tablet (180 mg total) by mouth daily.      fluticasone propionate 50 MCG/ACT Nasal Suspension 2 sprays by Nasal route daily.      gabapentin 600 MG Oral Tab 1 tablet (600 mg total) in the morning, at noon, in the evening, and at bedtime.      NOVOLOG FLEXPEN 100 UNIT/ML Subcutaneous Solution Pen-injector INJECT 10 UNITS UNDER THE SKIN BEFORE MEALS THREE TIMES DAILY      Insulin Aspart Prot & Aspart (INSULIN ASP PROT & ASP FLEXPEN) (70-30) 100 UNIT/ML Subcutaneous Suspension Pen-injector Inject 10 Units into the skin 2 (two) times daily with meals.      insulin degludec (TRESIBA FLEXTOUCH) 100 UNIT/ML Subcutaneous Solution Pen-injector Inject 40 Units into the skin daily.      levothyroxine 125 MCG Oral Tab Take 1 tablet (125 mcg total) by mouth before breakfast.      mexiletine 150 MG Oral Cap Take 2 capsules (300 mg total) by mouth 3 (three) times daily.      montelukast 10 MG Oral Tab Take 1 tablet (10 mg total) by mouth daily.      ursodiol 300 MG Oral Cap Take 2 capsules (600 mg total) by mouth 2 (two) times daily.      valsartan-hydroCHLOROthiazide 80-12.5 MG Oral Tab Take 1 tablet by mouth daily.      OZEMPIC, 2 MG/DOSE, 8 MG/3ML Subcutaneous Solution Pen-injector Inject 2 mg into the skin every 7 days. (Patient not taking: Reported on 6/20/2025)        Past Medical History:    ALS (amyotrophic lateral sclerosis) (HCC)     Asthma (HCC)    Diabetes (HCC)    Essential hypertension    Primary biliary cirrhosis (HCC)    Thyroid disease      Past Surgical History:   Procedure Laterality Date    Anesth,surgery of shoulder Right     Fracture surgery      Total abdom hysterectomy        History reviewed. No pertinent family history.   Social History     Socioeconomic History    Marital status: Single           REVIEW OF SYSTEMS:     No n/v/f/c.      EXAM:   There were no vitals taken for this visit.  GENERAL: well developed, well nourished, in no apparent distress  EXTREMITIES:   1. Integument: Normal skin temperature and turgor. Nails x 10 are elongated and thickened and with subungual debris, especially hallux nails.  2. Vascular: Pedal pulses are palpable.   3. Musculoskeletal: Flexion contracture to lesser digits. Decreased strength from ALS.   4. Neurological: Normal sharp dull sensation; reflexes normal. Protective sensation mostly intact via 10g monofilament.    Bilateral barefoot skin diabetic exam is abnormal with dystrophic nails and/or dry skin         ASSESSMENT AND PLAN:   Diagnoses and all orders for this visit:    Diabetic polyneuropathy associated with type 2 diabetes mellitus (HCC)    Hammer toes of both feet    Onychomycosis    ALS (amyotrophic lateral sclerosis) ([facility])            Plan:    -Patient examined, chart history reviewed.  -Discussed importance of proper pedal hygiene, regular foot checks, and tight glucose control.  -Sharply debrided nails x10 with a sterile nail nipper achieving a 20% reduction in thickness and length, without incident. Nails further smoothed with dremel.  -Ambulate with supportive shoes and inserts and avoid walking barefoot.  -Educated patient on acute signs of infection advised patient to seek immediate medical attention if symptoms arise.     The patient indicates understanding of these issues and agrees to the plan.    RTC 2 months.    DESHAUN Osorio speech recognition software was  used to prepare this note.  Errors in word recognition may occur.  Please contact me with any questions/concerns with this note.

## 2025-08-22 ENCOUNTER — OFFICE VISIT (OUTPATIENT)
Dept: PODIATRY CLINIC | Facility: CLINIC | Age: 70
End: 2025-08-22

## 2025-08-22 DIAGNOSIS — M20.41 HAMMER TOES OF BOTH FEET: ICD-10-CM

## 2025-08-22 DIAGNOSIS — B35.1 ONYCHOMYCOSIS: ICD-10-CM

## 2025-08-22 DIAGNOSIS — E11.42 DIABETIC POLYNEUROPATHY ASSOCIATED WITH TYPE 2 DIABETES MELLITUS (HCC): Primary | ICD-10-CM

## 2025-08-22 DIAGNOSIS — M20.42 HAMMER TOES OF BOTH FEET: ICD-10-CM

## 2025-08-22 DIAGNOSIS — G12.21 ALS (AMYOTROPHIC LATERAL SCLEROSIS) (HCC): ICD-10-CM

## 2025-08-22 PROCEDURE — 99213 OFFICE O/P EST LOW 20 MIN: CPT | Performed by: STUDENT IN AN ORGANIZED HEALTH CARE EDUCATION/TRAINING PROGRAM
